# Patient Record
Sex: FEMALE | Race: WHITE | NOT HISPANIC OR LATINO | Employment: STUDENT | URBAN - METROPOLITAN AREA
[De-identification: names, ages, dates, MRNs, and addresses within clinical notes are randomized per-mention and may not be internally consistent; named-entity substitution may affect disease eponyms.]

---

## 2017-10-23 ENCOUNTER — WORRISOME GROWTH - SEE NOTE (OUTPATIENT)
Dept: URBAN - METROPOLITAN AREA CLINIC 32 | Facility: CLINIC | Age: 42
Setting detail: DERMATOLOGY
End: 2017-10-23

## 2017-10-23 PROBLEM — L72.0 EPIDERMAL CYST: Status: RESOLVED | Noted: 2017-10-23

## 2017-10-23 PROBLEM — L82.1 OTHER SEBORRHEIC KERATOSIS: Status: RESOLVED | Noted: 2017-10-23

## 2017-10-23 PROBLEM — L82.1 OTHER SEBORRHEIC KERATOSIS: Status: ACTIVE | Noted: 2017-10-23

## 2017-10-23 PROBLEM — L82.0 INFLAMED SEBORRHEIC KERATOSIS: Status: RESOLVED | Noted: 2017-10-23

## 2017-10-23 PROCEDURE — 99213 OFFICE O/P EST LOW 20 MIN: CPT

## 2018-08-01 ENCOUNTER — NAIL CHANGES (OUTPATIENT)
Dept: URBAN - METROPOLITAN AREA CLINIC 32 | Facility: CLINIC | Age: 43
Setting detail: DERMATOLOGY
End: 2018-08-01

## 2018-08-01 PROCEDURE — 99212 OFFICE O/P EST SF 10 MIN: CPT

## 2018-10-11 ENCOUNTER — SKIN CANCER EXAM (OUTPATIENT)
Dept: URBAN - METROPOLITAN AREA CLINIC 32 | Facility: CLINIC | Age: 43
Setting detail: DERMATOLOGY
End: 2018-10-11

## 2018-10-11 PROCEDURE — 99214 OFFICE O/P EST MOD 30 MIN: CPT

## 2019-12-13 ENCOUNTER — SKIN CANCER EXAM (OUTPATIENT)
Dept: URBAN - METROPOLITAN AREA CLINIC 32 | Facility: CLINIC | Age: 44
Setting detail: DERMATOLOGY
End: 2019-12-13

## 2019-12-13 DIAGNOSIS — Z41.9 ENCOUNTER FOR PROCEDURE FOR PURPOSES OTHER THAN REMEDYING HEALTH STATE, UNSPECIFIED: ICD-10-CM

## 2019-12-13 PROCEDURE — 99214 OFFICE O/P EST MOD 30 MIN: CPT

## 2020-12-18 ENCOUNTER — SKIN CANCER EXAM (OUTPATIENT)
Dept: URBAN - METROPOLITAN AREA CLINIC 32 | Facility: CLINIC | Age: 45
Setting detail: DERMATOLOGY
End: 2020-12-18

## 2020-12-18 ENCOUNTER — RX ONLY (RX ONLY)
Age: 45
End: 2020-12-18

## 2020-12-18 DIAGNOSIS — D48.5 NEOPLASM OF UNCERTAIN BEHAVIOR OF SKIN: ICD-10-CM

## 2020-12-18 PROBLEM — L82.1 OTHER SEBORRHEIC KERATOSIS: Status: RESOLVED | Noted: 2020-12-18

## 2020-12-18 PROBLEM — L82.1 OTHER SEBORRHEIC KERATOSIS: Status: ACTIVE | Noted: 2020-12-18

## 2020-12-18 PROCEDURE — 99214 OFFICE O/P EST MOD 30 MIN: CPT

## 2021-12-10 ENCOUNTER — SKIN CANCER EXAM (OUTPATIENT)
Dept: URBAN - METROPOLITAN AREA CLINIC 32 | Facility: CLINIC | Age: 46
Setting detail: DERMATOLOGY
End: 2021-12-10

## 2021-12-10 DIAGNOSIS — Z48.02 ENCOUNTER FOR REMOVAL OF SUTURES: ICD-10-CM

## 2021-12-10 PROBLEM — D18.01 HEMANGIOMA OF SKIN AND SUBCUTANEOUS TISSUE: Status: RESOLVED | Noted: 2021-12-10

## 2021-12-10 PROBLEM — D18.01 HEMANGIOMA OF SKIN AND SUBCUTANEOUS TISSUE: Status: ACTIVE | Noted: 2021-12-10

## 2021-12-10 PROCEDURE — 99213 OFFICE O/P EST LOW 20 MIN: CPT

## 2022-11-30 ENCOUNTER — DASHBOARD ENCOUNTERS (OUTPATIENT)
Age: 47
End: 2022-11-30

## 2022-11-30 ENCOUNTER — OFFICE VISIT (OUTPATIENT)
Dept: URBAN - METROPOLITAN AREA SURGERY CENTER 18 | Facility: SURGERY CENTER | Age: 47
End: 2022-11-30
Payer: COMMERCIAL

## 2022-11-30 DIAGNOSIS — Z12.11 COLON CANCER SCREENING: ICD-10-CM

## 2022-11-30 PROCEDURE — 45378 DIAGNOSTIC COLONOSCOPY: CPT | Performed by: INTERNAL MEDICINE

## 2022-11-30 PROCEDURE — G8907 PT DOC NO EVENTS ON DISCHARG: HCPCS | Performed by: INTERNAL MEDICINE

## 2023-03-28 ENCOUNTER — APPOINTMENT (OUTPATIENT)
Dept: URBAN - METROPOLITAN AREA CLINIC 208 | Age: 48
Setting detail: DERMATOLOGY
End: 2023-04-03

## 2023-03-28 DIAGNOSIS — L57.8 OTHER SKIN CHANGES DUE TO CHRONIC EXPOSURE TO NONIONIZING RADIATION: ICD-10-CM

## 2023-03-28 DIAGNOSIS — L82.1 OTHER SEBORRHEIC KERATOSIS: ICD-10-CM

## 2023-03-28 DIAGNOSIS — L81.4 OTHER MELANIN HYPERPIGMENTATION: ICD-10-CM

## 2023-03-28 DIAGNOSIS — D18.0 HEMANGIOMA: ICD-10-CM

## 2023-03-28 DIAGNOSIS — L65.9 NONSCARRING HAIR LOSS, UNSPECIFIED: ICD-10-CM

## 2023-03-28 DIAGNOSIS — D22 MELANOCYTIC NEVI: ICD-10-CM

## 2023-03-28 PROBLEM — D22.5 MELANOCYTIC NEVI OF TRUNK: Status: ACTIVE | Noted: 2023-03-28

## 2023-03-28 PROBLEM — D18.01 HEMANGIOMA OF SKIN AND SUBCUTANEOUS TISSUE: Status: ACTIVE | Noted: 2023-03-28

## 2023-03-28 PROCEDURE — OTHER COUNSELING: OTHER

## 2023-03-28 PROCEDURE — OTHER MIPS QUALITY: OTHER

## 2023-03-28 PROCEDURE — 99213 OFFICE O/P EST LOW 20 MIN: CPT

## 2023-03-28 PROCEDURE — OTHER ADDITIONAL NOTES: OTHER

## 2023-03-28 ASSESSMENT — LOCATION ZONE DERM
LOCATION ZONE: FACE
LOCATION ZONE: SCALP
LOCATION ZONE: ARM
LOCATION ZONE: TRUNK
LOCATION ZONE: NECK

## 2023-03-28 ASSESSMENT — LOCATION DETAILED DESCRIPTION DERM
LOCATION DETAILED: EPIGASTRIC SKIN
LOCATION DETAILED: RIGHT MEDIAL FRONTAL SCALP
LOCATION DETAILED: SUPERIOR THORACIC SPINE
LOCATION DETAILED: LEFT INFERIOR ANTERIOR NECK
LOCATION DETAILED: MID TRAPEZIAL NECK
LOCATION DETAILED: RIGHT PROXIMAL DORSAL FOREARM
LOCATION DETAILED: LEFT PROXIMAL DORSAL FOREARM
LOCATION DETAILED: LEFT INFERIOR MEDIAL MALAR CHEEK
LOCATION DETAILED: SUPERIOR LUMBAR SPINE
LOCATION DETAILED: RIGHT CENTRAL MALAR CHEEK

## 2023-03-28 ASSESSMENT — LOCATION SIMPLE DESCRIPTION DERM
LOCATION SIMPLE: RIGHT CHEEK
LOCATION SIMPLE: UPPER BACK
LOCATION SIMPLE: ABDOMEN
LOCATION SIMPLE: LEFT ANTERIOR NECK
LOCATION SIMPLE: LEFT FOREARM
LOCATION SIMPLE: RIGHT SCALP
LOCATION SIMPLE: LOWER BACK
LOCATION SIMPLE: TRAPEZIAL NECK
LOCATION SIMPLE: LEFT CHEEK
LOCATION SIMPLE: RIGHT FOREARM

## 2023-03-28 NOTE — PROCEDURE: ADDITIONAL NOTES
Render Risk Assessment In Note?: no
Detail Level: Generalized
Additional Notes: Nutrafol and Viviscal taken orally discussed. Bloodwork from PCP requested to discuss for further treatment options

## 2024-03-28 ENCOUNTER — APPOINTMENT (OUTPATIENT)
Dept: URBAN - METROPOLITAN AREA CLINIC 208 | Age: 49
Setting detail: DERMATOLOGY
End: 2024-03-29

## 2024-03-28 DIAGNOSIS — L90.8 OTHER ATROPHIC DISORDERS OF SKIN: ICD-10-CM

## 2024-03-28 DIAGNOSIS — L91.8 OTHER HYPERTROPHIC DISORDERS OF THE SKIN: ICD-10-CM

## 2024-03-28 DIAGNOSIS — L82.1 OTHER SEBORRHEIC KERATOSIS: ICD-10-CM

## 2024-03-28 DIAGNOSIS — L71.8 OTHER ROSACEA: ICD-10-CM

## 2024-03-28 DIAGNOSIS — D18.0 HEMANGIOMA: ICD-10-CM

## 2024-03-28 DIAGNOSIS — D22 MELANOCYTIC NEVI: ICD-10-CM

## 2024-03-28 DIAGNOSIS — L81.4 OTHER MELANIN HYPERPIGMENTATION: ICD-10-CM

## 2024-03-28 DIAGNOSIS — L57.8 OTHER SKIN CHANGES DUE TO CHRONIC EXPOSURE TO NONIONIZING RADIATION: ICD-10-CM

## 2024-03-28 PROBLEM — D22.5 MELANOCYTIC NEVI OF TRUNK: Status: ACTIVE | Noted: 2024-03-28

## 2024-03-28 PROBLEM — D18.01 HEMANGIOMA OF SKIN AND SUBCUTANEOUS TISSUE: Status: ACTIVE | Noted: 2024-03-28

## 2024-03-28 PROCEDURE — 99213 OFFICE O/P EST LOW 20 MIN: CPT

## 2024-03-28 PROCEDURE — OTHER PATIENT SPECIFIC COUNSELING: OTHER

## 2024-03-28 PROCEDURE — OTHER COUNSELING: OTHER

## 2024-03-28 PROCEDURE — OTHER ADDITIONAL NOTES: OTHER

## 2024-03-28 PROCEDURE — OTHER OTHER: OTHER

## 2024-03-28 PROCEDURE — OTHER MIPS QUALITY: OTHER

## 2024-03-28 ASSESSMENT — LOCATION SIMPLE DESCRIPTION DERM
LOCATION SIMPLE: UPPER BACK
LOCATION SIMPLE: RIGHT CHEEK
LOCATION SIMPLE: LEFT CHEEK
LOCATION SIMPLE: LEFT ANTERIOR NECK
LOCATION SIMPLE: CHIN
LOCATION SIMPLE: RIGHT FOREARM
LOCATION SIMPLE: ABDOMEN
LOCATION SIMPLE: TRAPEZIAL NECK
LOCATION SIMPLE: LOWER BACK
LOCATION SIMPLE: LEFT FOREARM

## 2024-03-28 ASSESSMENT — LOCATION DETAILED DESCRIPTION DERM
LOCATION DETAILED: RIGHT CENTRAL MALAR CHEEK
LOCATION DETAILED: SUPERIOR LUMBAR SPINE
LOCATION DETAILED: LEFT INFERIOR ANTERIOR NECK
LOCATION DETAILED: LEFT INFERIOR MEDIAL MALAR CHEEK
LOCATION DETAILED: LEFT PROXIMAL DORSAL FOREARM
LOCATION DETAILED: LEFT CHIN
LOCATION DETAILED: LEFT RIB CAGE
LOCATION DETAILED: LEFT CENTRAL MALAR CHEEK
LOCATION DETAILED: EPIGASTRIC SKIN
LOCATION DETAILED: MID TRAPEZIAL NECK
LOCATION DETAILED: RIGHT PROXIMAL DORSAL FOREARM
LOCATION DETAILED: SUPERIOR THORACIC SPINE

## 2024-03-28 ASSESSMENT — LOCATION ZONE DERM
LOCATION ZONE: ARM
LOCATION ZONE: FACE
LOCATION ZONE: NECK
LOCATION ZONE: TRUNK

## 2024-03-28 NOTE — PROCEDURE: OTHER
Detail Level: Zone
Other (Free Text): Patient opted to not treat at this time.
Render Risk Assessment In Note?: no
Note Text (......Xxx Chief Complaint.): This diagnosis correlates with the

## 2024-03-28 NOTE — PROCEDURE: COUNSELING
Sunscreen Recommendations: For the face:\\nEltaMD\\nRevision\\nColor Science\\nSupergoop Unseen\\nLa Roche Posay\\nCeraVe Ultralight\\n\\nFor the body:\\nElta Sport\\nLa Roche Posay\\nNeutrogena\\nCoppertone
Detail Level: Generalized
Detail Level: Zone
Detail Level: Detailed
Patient Specific Counseling (Will Not Stick From Patient To Patient): Recommended Silicone Sheets

## 2024-03-28 NOTE — PROCEDURE: ADDITIONAL NOTES
Additional Notes: Specifically discussed EltaMD sunscreen product line and Aveeno Protect and Hydrate Sunscreen
Detail Level: Generalized
Render Risk Assessment In Note?: no

## 2024-03-28 NOTE — HPI: FULL BODY SKIN EXAMINATION
What Type Of Note Output Would You Prefer (Optional)?: Bullet Format
What Is The Reason For Today's Visit?: Full Body Skin Examination
What Is The Reason For Today's Visit? (Being Monitored For X): concerning skin lesions on an annual basis
Additional History: Pt c/o dark spot on right eyelid, present 2 weeks, no treatment.\\nSkin tag under left arm, noticed yesterday, states no irritation. \\nQuestions about left cheek being flushed and a crease on her chin. Wants to know if just aging. \\nWould like freckles checked, thinks bigger over time on legs. \\n\\nAnother appointment for hair loss, will send blood work via [facility].\\nLooking for recommendations for sunscreens/moisturizer.

## 2025-04-03 ENCOUNTER — APPOINTMENT (OUTPATIENT)
Dept: URBAN - METROPOLITAN AREA CLINIC 208 | Age: 50
Setting detail: DERMATOLOGY
End: 2025-04-03

## 2025-04-03 DIAGNOSIS — L82.1 OTHER SEBORRHEIC KERATOSIS: ICD-10-CM

## 2025-04-03 DIAGNOSIS — L57.8 OTHER SKIN CHANGES DUE TO CHRONIC EXPOSURE TO NONIONIZING RADIATION: ICD-10-CM

## 2025-04-03 DIAGNOSIS — L91.8 OTHER HYPERTROPHIC DISORDERS OF THE SKIN: ICD-10-CM

## 2025-04-03 DIAGNOSIS — D22 MELANOCYTIC NEVI: ICD-10-CM

## 2025-04-03 DIAGNOSIS — D18.0 HEMANGIOMA: ICD-10-CM

## 2025-04-03 DIAGNOSIS — L81.4 OTHER MELANIN HYPERPIGMENTATION: ICD-10-CM

## 2025-04-03 DIAGNOSIS — L72.0 EPIDERMAL CYST: ICD-10-CM

## 2025-04-03 PROBLEM — D22.5 MELANOCYTIC NEVI OF TRUNK: Status: ACTIVE | Noted: 2025-04-03

## 2025-04-03 PROBLEM — D18.01 HEMANGIOMA OF SKIN AND SUBCUTANEOUS TISSUE: Status: ACTIVE | Noted: 2025-04-03

## 2025-04-03 PROCEDURE — OTHER COUNSELING: OTHER

## 2025-04-03 PROCEDURE — OTHER SUNSCREEN RECOMMENDATIONS: OTHER

## 2025-04-03 PROCEDURE — OTHER ADDITIONAL NOTES: OTHER

## 2025-04-03 PROCEDURE — OTHER PATIENT SPECIFIC COUNSELING: OTHER

## 2025-04-03 PROCEDURE — OTHER MIPS QUALITY: OTHER

## 2025-04-03 PROCEDURE — 99213 OFFICE O/P EST LOW 20 MIN: CPT

## 2025-04-03 ASSESSMENT — LOCATION DETAILED DESCRIPTION DERM
LOCATION DETAILED: LEFT ANTERIOR SHOULDER
LOCATION DETAILED: SUPERIOR LUMBAR SPINE
LOCATION DETAILED: RIGHT CENTRAL MALAR CHEEK
LOCATION DETAILED: EPIGASTRIC SKIN
LOCATION DETAILED: LEFT RIB CAGE
LOCATION DETAILED: SUPERIOR THORACIC SPINE

## 2025-04-03 ASSESSMENT — LOCATION ZONE DERM
LOCATION ZONE: ARM
LOCATION ZONE: FACE
LOCATION ZONE: TRUNK

## 2025-04-03 ASSESSMENT — LOCATION SIMPLE DESCRIPTION DERM
LOCATION SIMPLE: LOWER BACK
LOCATION SIMPLE: LEFT SHOULDER
LOCATION SIMPLE: RIGHT CHEEK
LOCATION SIMPLE: ABDOMEN
LOCATION SIMPLE: UPPER BACK

## 2025-04-03 NOTE — PROCEDURE: COUNSELING
Sunscreen Recommendations: For the face:\\nEltaMD\\nRevision\\nColor Science\\nSupergoop Unseen\\nLa Roche Posay\\nCeraVe Ultralight\\n\\nFor the body:\\nElta Sport\\nLa Roche Posay\\nNeutrogena\\nCoppertone
Detail Level: Generalized
Detail Level: Zone
Patient Specific Counseling (Will Not Stick From Patient To Patient): Gold bond overnight retinol
Detail Level: Detailed
No